# Patient Record
(demographics unavailable — no encounter records)

---

## 2025-02-03 NOTE — ASSESSMENT
[FreeTextEntry1] : 73 YO male presenting for follow-up for mild thrombocytopenia since about July 2024.  Platelet count ranges from 103-123 but was previously 154 in October 2023.  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  He is eager to stop the daily medication due to pill burden and would prefer injectable B12 but I have explained that monthly injections are not really indicated for functional B12 deficiency unless he has difficulty swallowing.  Although he does have chronic dry mouth, he does not report dysphagia.  Arm paraesthesias have resolved on B12 therapy.   #thrombocytopenia- platelet count nearly normal at 143 - responding to the oral B12, continue until sees PCP in a few months - Should have a CBC q 6 months with PCP as he has been doing - Will contact PCP (Dr. Nasreen Sterling) to coordinate plan and minimize specialist follow-ups - If platelet count stable he can trial coming off the oral B12, but with the caveat that the count may drop again - If continues B12 therapy and the platelet count drops again, he should see me for follow-up or I will refer him to a specialist here for ITP evaluation - Suspect low platelet count the result of sulfonylurea/HCTZ effects and functional B12 deficiency - F/u screen for pernicious anemia- Anti-Parietal and Intrinsic Factor antibodies checked today.  If either of those is positive, he will need a GI evaluation.   - Have LFTs checked by PCP annually for questionable history of liver cirrhosis, as any liver problems can cause decreased platelets  - Viral screen (HIV/Hep) performed at initial consult was negative. - RTC prn, continue to follow-up regularly with PCP.  Patient understands I will call when all lab tests have resulted.  Patient confirms plan and in agreement. RTC pending results of above work-up.

## 2025-02-03 NOTE — PHYSICAL EXAM
[Restricted in physically strenuous activity but ambulatory and able to carry out work of a light or sedentary nature] : Status 1- Restricted in physically strenuous activity but ambulatory and able to carry out work of a light or sedentary nature, e.g., light house work, office work [Normal] : normoactive bowel sounds, soft and nontender, no hepatosplenomegaly or masses appreciated [de-identified] : BMI 31 [de-identified] : protuberant

## 2025-02-03 NOTE — HISTORY OF PRESENT ILLNESS
[de-identified] : Patient states he prefers to conduct the visit in English.   CC: thrombocytopenia   HPI: 71 YO male presenting for follow-up for mild thrombocytopenia since about July 2024.  Platelet count ranges from 103-123 but was previously 154 in October 2023.  He states he started the sulfonylurea glimepiride about 1.5-2 years ago which has bee associated with a decrease in platelets.  He also takes HCTZ which is associated with decreased platelet counts.  At initial consult, he was found to have an adequate serum B12 level with an elevated MMA in the absence of renal disease, which can indicate functional B12 deficiency when the serum level is between 400-700.  He was started on oral B12 1,000 mcg daily in November 2024 and today his platelet count is nearly normal at 143.  He is eager to stop the daily medication due to pill burden and would prefer injectable B12 but I have explained that monthly injections are not really indicated for functional B12 deficiency unless he has difficulty swallowing.  Although he does have chronic dry mouth, he does not report dysphagia.    I explained today that it appears as though he is responding to the oral B12 and he should continue it until he sees hi primary care doctor in a few months.  I will reach out to her (Dr. Nasreen Sterling) and update her on my thoughts.  The patient states his PCP sees him every 6 months for follow-up and blood work.  My suggestion is that he have her check the platelet count and if it remains stable he can trial coming off the oral B12, but with the caveat that the count may drop again.  Conversely, if he agrees to stay on the B12 therapy and the platelet count drops again, I would gladly see him again for follow-up or refer him to a specialist to evaluate for ITP.  My suspicion is that the low platelet count was and is the result of a combination of sulfonylurea/HCTZ effects and functional B12 deficiency.  I have checked Anti-Parietal and Intrinsic Factor antibodies today to screen for pernicious anemia- results are pending.  If either of those is positive, he will need a GI evaluation.  There was mention of liver cirrhosis in his history, but he is not aware of the details and his liver function has been normal.  Liver cirrhosis is associated with low platelets, so LFTs should be monitored by his PCP annually.  He states his arm parasthesias have resolved, which may be related to repletion of B12.  No new issues or complaints.